# Patient Record
Sex: FEMALE | URBAN - METROPOLITAN AREA
[De-identification: names, ages, dates, MRNs, and addresses within clinical notes are randomized per-mention and may not be internally consistent; named-entity substitution may affect disease eponyms.]

---

## 2022-06-09 ENCOUNTER — APPOINTMENT (OUTPATIENT)
Dept: URBAN - METROPOLITAN AREA CLINIC 193 | Age: 77
Setting detail: DERMATOLOGY
End: 2022-08-05

## 2022-06-09 DIAGNOSIS — Z41.9 ENCOUNTER FOR PROCEDURE FOR PURPOSES OTHER THAN REMEDYING HEALTH STATE, UNSPECIFIED: ICD-10-CM

## 2022-06-09 PROCEDURE — OTHER PRODUCT LINE (OBAGI): OTHER

## 2022-06-09 NOTE — PROCEDURE: PRODUCT LINE (OBAGI)
Allow Plan To Count Towards E/M Coding: Yes
Risk Of Complication Category: No MDM
Detail Level: Zone
Assigning Risk Information: Per AMA, level of risk is based upon consequences of the problem(s) addressed at the encounter when appropriately treated. Risk also includes medical decision making related to the need to initiate or forego further testing, treatment and/or hospitalization. Over the counter medication are assigned a risk level of low. Prescription medication management is assigned a risk level of moderate.
Name Of Product 1: Obagi 1% tretinoin
Product 1 Price (In Dollars - Numeric Only, No Special Characters Or $): 44.00
Product 1 Units: 1
Name Of Product 2: Obagi Elastiderm Eye Cream
Product 2 Price (In Dollars - Numeric Only, No Special Characters Or $): 57.75
Product 3 Price (In Dollars - Numeric Only, No Special Characters Or $): 0.00
Product 3 Units: 0